# Patient Record
Sex: MALE | Race: WHITE
[De-identification: names, ages, dates, MRNs, and addresses within clinical notes are randomized per-mention and may not be internally consistent; named-entity substitution may affect disease eponyms.]

---

## 2018-03-15 ENCOUNTER — HOSPITAL ENCOUNTER (EMERGENCY)
Dept: HOSPITAL 17 - PHED | Age: 83
Discharge: HOME | End: 2018-03-15
Payer: MEDICARE

## 2018-03-15 VITALS
SYSTOLIC BLOOD PRESSURE: 133 MMHG | RESPIRATION RATE: 18 BRPM | HEART RATE: 92 BPM | DIASTOLIC BLOOD PRESSURE: 79 MMHG | OXYGEN SATURATION: 97 %

## 2018-03-15 VITALS
HEART RATE: 76 BPM | DIASTOLIC BLOOD PRESSURE: 60 MMHG | SYSTOLIC BLOOD PRESSURE: 124 MMHG | TEMPERATURE: 97.6 F | RESPIRATION RATE: 16 BRPM | OXYGEN SATURATION: 96 %

## 2018-03-15 VITALS
HEART RATE: 76 BPM | OXYGEN SATURATION: 98 % | DIASTOLIC BLOOD PRESSURE: 67 MMHG | SYSTOLIC BLOOD PRESSURE: 123 MMHG | RESPIRATION RATE: 18 BRPM

## 2018-03-15 VITALS — WEIGHT: 200.62 LBS | HEIGHT: 70 IN | BODY MASS INDEX: 28.72 KG/M2

## 2018-03-15 VITALS — RESPIRATION RATE: 18 BRPM | DIASTOLIC BLOOD PRESSURE: 76 MMHG | SYSTOLIC BLOOD PRESSURE: 128 MMHG

## 2018-03-15 DIAGNOSIS — R94.31: ICD-10-CM

## 2018-03-15 DIAGNOSIS — I10: ICD-10-CM

## 2018-03-15 DIAGNOSIS — R42: Primary | ICD-10-CM

## 2018-03-15 DIAGNOSIS — Z79.01: ICD-10-CM

## 2018-03-15 LAB
ALBUMIN SERPL-MCNC: 3.9 GM/DL (ref 3.4–5)
ALP SERPL-CCNC: 81 U/L (ref 45–117)
ALT SERPL-CCNC: 18 U/L (ref 12–78)
AST SERPL-CCNC: 16 U/L (ref 15–37)
BASOPHILS # BLD AUTO: 0.1 TH/MM3 (ref 0–0.2)
BASOPHILS NFR BLD: 1.9 % (ref 0–2)
BILIRUB SERPL-MCNC: 0.5 MG/DL (ref 0.2–1)
BUN SERPL-MCNC: 22 MG/DL (ref 7–18)
CALCIUM SERPL-MCNC: 9.1 MG/DL (ref 8.5–10.1)
CHLORIDE SERPL-SCNC: 105 MEQ/L (ref 98–107)
CREAT SERPL-MCNC: 1.1 MG/DL (ref 0.6–1.3)
EOSINOPHIL # BLD: 0.3 TH/MM3 (ref 0–0.4)
EOSINOPHIL NFR BLD: 4.8 % (ref 0–4)
ERYTHROCYTE [DISTWIDTH] IN BLOOD BY AUTOMATED COUNT: 14.4 % (ref 11.6–17.2)
GFR SERPLBLD BASED ON 1.73 SQ M-ARVRAT: 64 ML/MIN (ref 89–?)
GLUCOSE SERPL-MCNC: 83 MG/DL (ref 74–106)
HCO3 BLD-SCNC: 28.8 MEQ/L (ref 21–32)
HCT VFR BLD CALC: 39.1 % (ref 39–51)
HGB BLD-MCNC: 12.9 GM/DL (ref 13–17)
LYMPHOCYTES # BLD AUTO: 1.6 TH/MM3 (ref 1–4.8)
LYMPHOCYTES NFR BLD AUTO: 25.1 % (ref 9–44)
MAGNESIUM SERPL-MCNC: 2.3 MG/DL (ref 1.5–2.5)
MCH RBC QN AUTO: 33.1 PG (ref 27–34)
MCHC RBC AUTO-ENTMCNC: 32.9 % (ref 32–36)
MCV RBC AUTO: 100.4 FL (ref 80–100)
MONOCYTE #: 0.7 TH/MM3 (ref 0–0.9)
MONOCYTES NFR BLD: 10.3 % (ref 0–8)
NEUTROPHILS # BLD AUTO: 3.8 TH/MM3 (ref 1.8–7.7)
NEUTROPHILS NFR BLD AUTO: 57.9 % (ref 16–70)
PLATELET # BLD: 127 TH/MM3 (ref 150–450)
PMV BLD AUTO: 8 FL (ref 7–11)
PROT SERPL-MCNC: 6.9 GM/DL (ref 6.4–8.2)
RBC # BLD AUTO: 3.89 MIL/MM3 (ref 4.5–5.9)
SODIUM SERPL-SCNC: 140 MEQ/L (ref 136–145)
TROPONIN I SERPL-MCNC: (no result) NG/ML (ref 0.02–0.05)
WBC # BLD AUTO: 6.5 TH/MM3 (ref 4–11)

## 2018-03-15 PROCEDURE — 84484 ASSAY OF TROPONIN QUANT: CPT

## 2018-03-15 PROCEDURE — 70551 MRI BRAIN STEM W/O DYE: CPT

## 2018-03-15 PROCEDURE — 85025 COMPLETE CBC W/AUTO DIFF WBC: CPT

## 2018-03-15 PROCEDURE — 99285 EMERGENCY DEPT VISIT HI MDM: CPT

## 2018-03-15 PROCEDURE — 80048 BASIC METABOLIC PNL TOTAL CA: CPT

## 2018-03-15 PROCEDURE — 83735 ASSAY OF MAGNESIUM: CPT

## 2018-03-15 PROCEDURE — 82550 ASSAY OF CK (CPK): CPT

## 2018-03-15 PROCEDURE — 93005 ELECTROCARDIOGRAM TRACING: CPT

## 2018-03-15 PROCEDURE — 80053 COMPREHEN METABOLIC PANEL: CPT

## 2018-03-15 NOTE — PD
HPI


Chief Complaint:  Dizziness


Time Seen by Provider:  11:56


Travel History


International Travel<30 days:  No


Contact w/Intl Traveler<30days:  No


Traveled to known affect area:  No





History of Present Illness


HPI


The patient is a 84-year-old  male who presents to the emergency 

department via private vehicle for dizziness.  The patient notes a 3 day 

history of intermittent dizziness, worse on Tuesday morning upon wakening.  The 

patient felt lightheaded and off balance while ambulating.  The patient's 

dizziness then resolved.  He was asymptomatic on Wednesday, however, the 

dizziness returned today.  The dizziness is worse with ambulation, however, he 

is able to ambulate without walking into walls.  He denies any headache, chest 

pain, shortness of breath, nausea, vomiting, or abdominal pain.  He denies any 

vertigo.  He denies any tinnitus or hearing changes.  He does have a history of 

previous carotid endarterectomy and hypertension, denies any previous history 

of CVA or TIA.  The patient is currently visiting from [facility], has 

an appointment in the near future with a new physician, Dr. Ohara.





PFSH


Past Medical History


Hx Anticoagulant Therapy:  Yes (asa 81mg)


Cardiovascular Problems:  Yes (Htn on meds, MI)


High Cholesterol:  Yes


Coronary Artery Disease:  Yes


Diminished Hearing:  No


Gout:  Yes


Hypertension:  Yes


Influenza Vaccination:  Yes





Past Surgical History


Cardiac Surgery:  Yes (right carotid endartectomy)





Social History


Alcohol Use:  Yes


Tobacco Use:  No





Allergies-Medications


(Allergen,Severity, Reaction):  


Coded Allergies:  


     No Known Allergies (Unverified , 3/15/18)


Reported Meds & Prescriptions





Reported Meds & Active Scripts


Active


Reported


Metoprolol Succinate ER 24 HR (Metoprolol Succinate) 25 Mg Tab 25 Mg PO DAILY


Furosemide 20 Mg Tab 20 Mg PO DAILY


Benazepril (Benazepril HCl) 40 Mg Tab 40 Mg PO DAILY


Allopurinol 300 Mg Tab 300 Mg PO DAILY


Lipitor (Atorvastatin Calcium) 10 Mg Tab 10 Mg PO HS


Plavix (Clopidogrel Bisulfate) 75 Mg Tab 75 Mg PO DAILY


Centrum (Multiple Vitamins W/ Minerals) 1 Chew 1 Tab CHEW DAILY


Aspirin Low Dose (Aspirin) 81 Mg Chew 81 Mg CHEW DAILY








Review of Systems


Except as stated in HPI:  all other systems reviewed are Neg


Eyes:  No: Blurred Vision


HENT:  Positive: Lightheadedness


Cardiovascular:  No: Chest Pain or Discomfort, Dyspnea on exertion


Respiratory:  No: Shortness of Breath


Gastrointestinal:  No: Nausea, Vomiting, Abdominal Pain


Neurologic:  Positive: Dizziness, No: Focal Abnormalities, Coordination Problem

, Ataxia, Headache, Change in Mentation, Slurred Speech, Paresthesia, Sensory 

Disturbance





Physical Exam


Narrative


GENERAL: Awake, alert, pleasant 84-year-old male who appears his stated age and 

is in no acute respiratory distress.


SKIN: Focused skin assessment warm/dry.


HEAD: Atraumatic. Normocephalic. 


EYES: Pupils equal and round.  3 mm bilateral and reactive.  EOMs are intact.


ENT: No nasal bleeding or discharge.  Upper dentures in place.  


NECK: Trachea midline. No JVD.  Well-healed scar right neck from previous 

carotid endarterectomy.


CARDIOVASCULAR: Regular rate and rhythm.  No murmur appreciated.


RESPIRATORY: No accessory muscle use. Clear to auscultation. Breath sounds 

equal bilaterally. 


GASTROINTESTINAL: Abdomen soft, non-tender, nondistended.  No rebound 

tenderness.


MUSCULOSKELETAL: No obvious deformities. No clubbing.  No cyanosis.  Bilateral 

lower extremity pitting edema mid calf inferiorly.


NEUROLOGICAL: Awake and alert. No obvious cranial nerve deficits.  Motor 

grossly within normal limits. Normal speech.  Smile is symmetric.  No 

dysarthria.  EOMs are intact.  Patient is able to see fingers at a distance of 

2 feet without difficulty.  No drift of the upper or lower extremities.  Finger 

to nose is normal.  Heel to shin is normal.  Sensation is symmetric in the arms

, legs, and face.


PSYCHIATRIC: Appropriate mood and affect; insight and judgment normal.





Data


Data


Last Documented VS





Vital Signs








  Date Time  Temp Pulse Resp B/P (MAP) Pulse Ox O2 Delivery O2 Flow Rate FiO2


 


3/15/18 13:55  92 18 133/79 (97) 97 Room Air  


 


3/15/18 10:16 97.6       








Orders





 Orders


Electrocardiogram (3/15/18 12:11)


Complete Blood Count With Diff (3/15/18 12:11)


Comprehensive Metabolic Panel (3/15/18 12:11)


Magnesium (Mg) (3/15/18 12:11)


Ckmb (Isoenzyme) Profile (3/15/18 12:11)


Troponin I (3/15/18 12:11)


Ecg Monitoring (3/15/18 12:11)


Iv Access Insert/Monitor (3/15/18 12:11)


Oximetry (3/15/18 12:11)


Sodium Chloride 0.9% Flush (Ns Flush) (3/15/18 12:15)


Orthostatic Vital Signs (3/15/18 12:11)


Mri Brain W/O Contrast (3/15/18 )


I-Stat Profile (3/15/18 12:25)





Labs





Laboratory Tests








Test


  3/15/18


12:25


 


White Blood Count 6.5 TH/MM3 


 


Red Blood Count 3.89 MIL/MM3 


 


Hemoglobin 12.9 GM/DL 


 


Bedside Hemoglobin  G/DL 


 


Hematocrit 39.1 % 


 


Bedside Hematocrit  % 


 


Mean Corpuscular Volume 100.4 FL 


 


Mean Corpuscular Hemoglobin 33.1 PG 


 


Mean Corpuscular Hemoglobin


Concent 32.9 % 


 


 


Red Cell Distribution Width 14.4 % 


 


Platelet Count 127 TH/MM3 


 


Mean Platelet Volume 8.0 FL 


 


Neutrophils (%) (Auto) 57.9 % 


 


Lymphocytes (%) (Auto) 25.1 % 


 


Monocytes (%) (Auto) 10.3 % 


 


Eosinophils (%) (Auto) 4.8 % 


 


Basophils (%) (Auto) 1.9 % 


 


Neutrophils # (Auto) 3.8 TH/MM3 


 


Lymphocytes # (Auto) 1.6 TH/MM3 


 


Monocytes # (Auto) 0.7 TH/MM3 


 


Eosinophils # (Auto) 0.3 TH/MM3 


 


Basophils # (Auto) 0.1 TH/MM3 


 


CBC Comment DIFF FINAL 


 


Differential Comment  


 


Bedside Sodium 140 MMOL/L 


 


Blood Urea Nitrogen 22 MG/DL 


 


Creatinine 1.10 MG/DL 


 


Random Glucose 83 MG/DL 


 


Total Protein 6.9 GM/DL 


 


Albumin 3.9 GM/DL 


 


Calcium Level 9.1 MG/DL 


 


Magnesium Level 2.3 MG/DL 


 


Alkaline Phosphatase 81 U/L 


 


Aspartate Amino Transf


(AST/SGOT) 16 U/L 


 


 


Alanine Aminotransferase


(ALT/SGPT) 18 U/L 


 


 


Total Bilirubin 0.5 MG/DL 


 


Sodium Level 140 MEQ/L 


 


Potassium Level 4.2 MEQ/L 


 


Chloride Level 105 MEQ/L 


 


Carbon Dioxide Level 28.8 MEQ/L 


 


Bedside Potassium 4.1 MMOL/L 


 


Bedside Chloride 102 MMOL/L 


 


Anion Gap 6 MEQ/L 


 


Bedside Blood Urea Nitrogen 23 MG/DL 


 


Bedside Creatinine 1.1 MG/DL 


 


Estimat Glomerular Filtration


Rate 64 ML/MIN 


 


 


Bedside Glucose 86 MG/DL 


 


Total Creatine Kinase 62 U/L 


 


Troponin I


  LESS THAN 0.02


NG/ML











MDM


Medical Decision Making


Medical Screen Exam Complete:  Yes


Emergency Medical Condition:  Yes


Medical Record Reviewed:  Yes


Interpretation(s)


EKG reveals normal sinus rhythm with a rate of 65.  Q-wave noted in lead II, III

, and aVF.  Q-wave noted in lead V5, V6, and I.  ST elevation noted in V6 that 

is not seen in V5 or lead I.








Laboratory Tests








Test


  3/15/18


12:25


 


White Blood Count 6.5 TH/MM3 


 


Red Blood Count 3.89 MIL/MM3 


 


Hemoglobin 12.9 GM/DL 


 


Bedside Hemoglobin  G/DL 


 


Hematocrit 39.1 % 


 


Bedside Hematocrit  % 


 


Mean Corpuscular Volume 100.4 FL 


 


Mean Corpuscular Hemoglobin 33.1 PG 


 


Mean Corpuscular Hemoglobin


Concent 32.9 % 


 


 


Red Cell Distribution Width 14.4 % 


 


Platelet Count 127 TH/MM3 


 


Mean Platelet Volume 8.0 FL 


 


Neutrophils (%) (Auto) 57.9 % 


 


Lymphocytes (%) (Auto) 25.1 % 


 


Monocytes (%) (Auto) 10.3 % 


 


Eosinophils (%) (Auto) 4.8 % 


 


Basophils (%) (Auto) 1.9 % 


 


Neutrophils # (Auto) 3.8 TH/MM3 


 


Lymphocytes # (Auto) 1.6 TH/MM3 


 


Monocytes # (Auto) 0.7 TH/MM3 


 


Eosinophils # (Auto) 0.3 TH/MM3 


 


Basophils # (Auto) 0.1 TH/MM3 


 


CBC Comment DIFF FINAL 


 


Differential Comment  


 


Bedside Sodium 140 MMOL/L 


 


Blood Urea Nitrogen 22 MG/DL 


 


Creatinine 1.10 MG/DL 


 


Random Glucose 83 MG/DL 


 


Total Protein 6.9 GM/DL 


 


Albumin 3.9 GM/DL 


 


Calcium Level 9.1 MG/DL 


 


Magnesium Level 2.3 MG/DL 


 


Alkaline Phosphatase 81 U/L 


 


Aspartate Amino Transf


(AST/SGOT) 16 U/L 


 


 


Alanine Aminotransferase


(ALT/SGPT) 18 U/L 


 


 


Total Bilirubin 0.5 MG/DL 


 


Sodium Level 140 MEQ/L 


 


Potassium Level 4.2 MEQ/L 


 


Chloride Level 105 MEQ/L 


 


Carbon Dioxide Level 28.8 MEQ/L 


 


Bedside Potassium 4.1 MMOL/L 


 


Bedside Chloride 102 MMOL/L 


 


Anion Gap 6 MEQ/L 


 


Bedside Blood Urea Nitrogen 23 MG/DL 


 


Bedside Creatinine 1.1 MG/DL 


 


Estimat Glomerular Filtration


Rate 64 ML/MIN 


 


 


Bedside Glucose 86 MG/DL 


 


Total Creatine Kinase 62 U/L 


 


Troponin I


  LESS THAN 0.02


NG/ML








Last Impressions








Brain MRI 3/15/18 0000 Signed





Impressions: 





 Service Date/Time:  Thursday, March 15, 2018 13:41 - CONCLUSION:  1. No acute 





 findings. Moderate chronic ischemic changes in the periventricular white 

matter. 





 Remote small infarct right parietal lobe. No recent infarct, mass effect, 





 hemorrhage or shift. No hydrocephalus.     Reji Sullivan MD 








Differential Diagnosis


Differential diagnosis includes vertigo, labyrinthitis, arrhythmia, hyponatremia

, dehydration, orthostatic hypotension, cerebellar infarct, intracranial 

hemorrhage.


Narrative Course


IV was established, labs are drawn and sent, and the patient was placed on 

cardiac telemetry monitoring and continuous pulse oximetry monitoring.  EKG was 

ordered and interpreted.  Orthostatic vital signs were obtained.  MRI of the 

brain was obtained to evaluate for possible cerebellar infarct.  CBC reveals 

minimally low hemoglobin 12.9 and platelet of 127.  CMP is essentially 

unremarkable.  Troponin is less than 0.02.  Telemetry monitoring reveals 

occasional PVCs, but no evidence of nonsustained V. tach.  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 

parietal infarct, no acute findings.  The patient was able to ambulate to the 

bathroom.  Patient has intermittent dizziness, is advised to follow-up with his 

primary physician.  He will be provided a copy of his MRI results and lab 

results at discharge.  Return if symptoms worsen or progress.





Diagnosis





 Primary Impression:  


 Dizziness


Patient Instructions:  General Instructions





***Additional Instructions:  


Please provide the patient a copy of his MRI results and lab results at 

discharge.  Follow-up with your primary physician.  Return if symptoms worsen 

or progress.


***Med/Other Pt SpecificInfo:  No Change to Meds


Disposition:  01 DISCHARGE HOME


Condition:  Stable











Caleb Khan MD Mar 15, 2018 12:18

## 2018-03-15 NOTE — RADRPT
EXAM DATE/TIME:  03/15/2018 13:41 

 

[facility] COMPARISON:     

No previous studies available for comparison.

       

 

 

INDICATIONS :     

Dizziness.

                     

 

MEDICAL HISTORY :     

Hypertension.     

 

SURGICAL HISTORY :     

Carotid endarterectomy.     

 

ENCOUNTER:     

Initial

 

ACUITY:     

3 day

 

PAIN SCORE:     

0/10

 

LOCATION:         

Head.

 

TECHNIQUE:     

Multiplanar, multisequence MRI of the brain was performed without contrast.

 

FINDINGS:     

 

CEREBRUM:     

The ventricles are normal for age.  No evidence of midline shift, mass lesion, hemorrhage or acute in
farction.  No extraaxial fluid collections are seen.  The pituitary gland and suprasellar cistern are
 normal in configuration.

 

WHITE MATTER:     

Moderate signal abnormalities are seen in the white matter.

 

POSTERIOR FOSSA:     

The cerebellum and brainstem are intact.  The 4th ventricle is midline. The cerebellopontine angle is
 unremarkable.  The cerebellar tonsils are normal in position.

 

DIFFUSION IMAGING:     

No focal areas of restricted diffusion are seen.  No evidence of acute infarction.

 

EXTRACRANIAL:     

The visualized portions of the orbits and paranasal sinuses are unremarkable.

 

CONCLUSION:     

1. No acute findings. Moderate chronic ischemic changes in the periventricular white matter. Remote s
mall infarct right parietal lobe. No recent infarct, mass effect, hemorrhage or shift. No hydrocephal
us.

 

 

 

 Reji Sullivan MD on March 15, 2018 at 13:57           

Board Certified Radiologist.

 This report was verified electronically.

## 2018-03-16 NOTE — EKG
Date Performed: 03/15/2018       Time Performed: 12:34:03

 

PTAGE:      84 years

 

EKG:      Sinus rhythm 

 

 WITH FIRST DEGREE AV BLOCK LATERAL MYOCARDIAL INFARCTION INFERIOR MYOCARDIAL INFARCTION ABNORMAL ECG
 

 

NO PREVIOUS TRACING             Clinical correlation is recommended.

 

DOCTOR:   Damian Roldan  Interpretating Date/Time  03/16/2018 14:29:39